# Patient Record
Sex: MALE | Race: WHITE | Employment: OTHER | URBAN - METROPOLITAN AREA
[De-identification: names, ages, dates, MRNs, and addresses within clinical notes are randomized per-mention and may not be internally consistent; named-entity substitution may affect disease eponyms.]

---

## 2018-08-15 ENCOUNTER — HOSPITAL ENCOUNTER (EMERGENCY)
Age: 53
Discharge: LEFT BEFORE DISCHARGE | End: 2018-08-15
Payer: COMMERCIAL

## 2018-08-15 VITALS
TEMPERATURE: 97.3 F | DIASTOLIC BLOOD PRESSURE: 83 MMHG | OXYGEN SATURATION: 97 % | WEIGHT: 195 LBS | RESPIRATION RATE: 18 BRPM | SYSTOLIC BLOOD PRESSURE: 170 MMHG | BODY MASS INDEX: 27.92 KG/M2 | HEIGHT: 70 IN | HEART RATE: 74 BPM

## 2018-08-15 PROCEDURE — 4500000002 HC ER NO CHARGE

## 2018-08-15 RX ORDER — IBUPROFEN 200 MG
200 TABLET ORAL EVERY 6 HOURS PRN
COMMUNITY

## 2018-08-15 ASSESSMENT — PAIN SCALES - GENERAL: PAINLEVEL_OUTOF10: 5

## 2018-08-16 NOTE — ED PROVIDER NOTES
8:30 PM attempted to interview patient in room patient was not in room. I believe the patient left without being seen just after RN triage.      Oliva Wilcox PA-C  08/15/18 9253

## 2018-10-24 ENCOUNTER — HOSPITAL ENCOUNTER (EMERGENCY)
Age: 53
Discharge: ANOTHER HOSPITAL | End: 2018-10-24
Attending: EMERGENCY MEDICINE
Payer: COMMERCIAL

## 2018-10-24 ENCOUNTER — APPOINTMENT (OUTPATIENT)
Dept: GENERAL RADIOLOGY | Age: 53
End: 2018-10-24
Payer: COMMERCIAL

## 2018-10-24 ENCOUNTER — APPOINTMENT (OUTPATIENT)
Dept: CT IMAGING | Age: 53
End: 2018-10-24
Payer: COMMERCIAL

## 2018-10-24 VITALS
OXYGEN SATURATION: 98 % | DIASTOLIC BLOOD PRESSURE: 83 MMHG | SYSTOLIC BLOOD PRESSURE: 138 MMHG | HEIGHT: 71 IN | HEART RATE: 86 BPM | WEIGHT: 200 LBS | TEMPERATURE: 97.4 F | BODY MASS INDEX: 28 KG/M2 | RESPIRATION RATE: 15 BRPM

## 2018-10-24 DIAGNOSIS — S32.009A CLOSED FRACTURE OF LUMBAR VERTEBRA, UNSPECIFIED FRACTURE MORPHOLOGY, UNSPECIFIED LUMBAR VERTEBRAL LEVEL, INITIAL ENCOUNTER (HCC): ICD-10-CM

## 2018-10-24 DIAGNOSIS — S92.012A CLOSED DISPLACED FRACTURE OF BODY OF LEFT CALCANEUS, INITIAL ENCOUNTER: Primary | ICD-10-CM

## 2018-10-24 LAB
A/G RATIO: 1.8 (ref 1.1–2.2)
ALBUMIN SERPL-MCNC: 4.9 G/DL (ref 3.4–5)
ALP BLD-CCNC: 73 U/L (ref 40–129)
ALT SERPL-CCNC: 29 U/L (ref 10–40)
ANION GAP SERPL CALCULATED.3IONS-SCNC: 14 MMOL/L (ref 3–16)
AST SERPL-CCNC: 34 U/L (ref 15–37)
BASOPHILS ABSOLUTE: 0.1 K/UL (ref 0–0.2)
BASOPHILS RELATIVE PERCENT: 0.7 %
BILIRUB SERPL-MCNC: 0.5 MG/DL (ref 0–1)
BUN BLDV-MCNC: 13 MG/DL (ref 7–20)
CALCIUM SERPL-MCNC: 9.8 MG/DL (ref 8.3–10.6)
CHLORIDE BLD-SCNC: 101 MMOL/L (ref 99–110)
CO2: 25 MMOL/L (ref 21–32)
CREAT SERPL-MCNC: 1 MG/DL (ref 0.9–1.3)
EOSINOPHILS ABSOLUTE: 0.1 K/UL (ref 0–0.6)
EOSINOPHILS RELATIVE PERCENT: 0.7 %
GFR AFRICAN AMERICAN: >60
GFR NON-AFRICAN AMERICAN: >60
GLOBULIN: 2.8 G/DL
GLUCOSE BLD-MCNC: 117 MG/DL (ref 70–99)
HCT VFR BLD CALC: 46.8 % (ref 40.5–52.5)
HEMOGLOBIN: 15.9 G/DL (ref 13.5–17.5)
LYMPHOCYTES ABSOLUTE: 4 K/UL (ref 1–5.1)
LYMPHOCYTES RELATIVE PERCENT: 32.7 %
MCH RBC QN AUTO: 29.5 PG (ref 26–34)
MCHC RBC AUTO-ENTMCNC: 34 G/DL (ref 31–36)
MCV RBC AUTO: 86.9 FL (ref 80–100)
MONOCYTES ABSOLUTE: 0.7 K/UL (ref 0–1.3)
MONOCYTES RELATIVE PERCENT: 6 %
NEUTROPHILS ABSOLUTE: 7.3 K/UL (ref 1.7–7.7)
NEUTROPHILS RELATIVE PERCENT: 59.9 %
PDW BLD-RTO: 13.1 % (ref 12.4–15.4)
PLATELET # BLD: 270 K/UL (ref 135–450)
PMV BLD AUTO: 7.6 FL (ref 5–10.5)
POTASSIUM SERPL-SCNC: 3.8 MMOL/L (ref 3.5–5.1)
RBC # BLD: 5.39 M/UL (ref 4.2–5.9)
SODIUM BLD-SCNC: 140 MMOL/L (ref 136–145)
TOTAL PROTEIN: 7.7 G/DL (ref 6.4–8.2)
WBC # BLD: 12.3 K/UL (ref 4–11)

## 2018-10-24 PROCEDURE — 93005 ELECTROCARDIOGRAM TRACING: CPT | Performed by: EMERGENCY MEDICINE

## 2018-10-24 PROCEDURE — 96376 TX/PRO/DX INJ SAME DRUG ADON: CPT

## 2018-10-24 PROCEDURE — 99285 EMERGENCY DEPT VISIT HI MDM: CPT

## 2018-10-24 PROCEDURE — 72131 CT LUMBAR SPINE W/O DYE: CPT

## 2018-10-24 PROCEDURE — 70450 CT HEAD/BRAIN W/O DYE: CPT

## 2018-10-24 PROCEDURE — 96374 THER/PROPH/DIAG INJ IV PUSH: CPT

## 2018-10-24 PROCEDURE — 80053 COMPREHEN METABOLIC PANEL: CPT

## 2018-10-24 PROCEDURE — 4500000025 HC ED LEVEL 5 PROCEDURE

## 2018-10-24 PROCEDURE — 6360000002 HC RX W HCPCS: Performed by: EMERGENCY MEDICINE

## 2018-10-24 PROCEDURE — 72125 CT NECK SPINE W/O DYE: CPT

## 2018-10-24 PROCEDURE — 73610 X-RAY EXAM OF ANKLE: CPT

## 2018-10-24 PROCEDURE — 85025 COMPLETE CBC W/AUTO DIFF WBC: CPT

## 2018-10-24 RX ORDER — HYDROMORPHONE HCL 110MG/55ML
1 PATIENT CONTROLLED ANALGESIA SYRINGE INTRAVENOUS ONCE
Status: COMPLETED | OUTPATIENT
Start: 2018-10-24 | End: 2018-10-24

## 2018-10-24 RX ADMIN — HYDROMORPHONE HYDROCHLORIDE 1 MG: 2 INJECTION INTRAMUSCULAR; INTRAVENOUS; SUBCUTANEOUS at 18:39

## 2018-10-24 RX ADMIN — HYDROMORPHONE HYDROCHLORIDE 1 MG: 2 INJECTION INTRAMUSCULAR; INTRAVENOUS; SUBCUTANEOUS at 17:25

## 2018-10-24 RX ADMIN — HYDROMORPHONE HYDROCHLORIDE 1 MG: 2 INJECTION INTRAMUSCULAR; INTRAVENOUS; SUBCUTANEOUS at 16:58

## 2018-10-24 ASSESSMENT — PAIN SCALES - GENERAL
PAINLEVEL_OUTOF10: 10

## 2018-10-24 ASSESSMENT — PAIN DESCRIPTION - LOCATION: LOCATION: ANKLE;BACK

## 2018-10-24 ASSESSMENT — PAIN DESCRIPTION - ORIENTATION: ORIENTATION: LEFT;LOWER

## 2018-10-24 NOTE — ED PROVIDER NOTES
No fracture or malalignment of the cervical spine. CT Head WO Contrast   Final Result   No acute intracranial abnormality. No fracture or malalignment of the cervical spine. XR ANKLE LEFT (MIN 3 VIEWS)   Final Result   Comminuted fracture left calcaneus with flattening and disruption of the   subtalar joint with multiple fracture fragments. Probable midfoot instability with widening of the tarsal navicular-cuneiform   articulation. RECOMMENDATION:   Recommend orthopedic consultation. CT scan of the foot might be considered   for further evaluation of complex fracture-instability noted above. Procedures    MDM:  After my initial evaluation, I do feel these require a CT scan of the lumbar spine, cervical spine, and head. The patient was in need to have x-rays of his left ankle. Patient's x-rays demonstrate what appeared to calcaneus fracture with what appears midfoot injury as well. The patient has no abdominal pain, or chest pain/difficulty breathing. The patient had an obvious calcaneal fracture on x-ray, and the CT of the lumbar spine demonstrates fractures of L1, and the spinous processes of L2 and L3. The patient was kept in the cervical collar after his CT of the cervical spine and head were normal.  I did contact Dr. Keyanna Jamison from  trauma, and they are willing to accept the patient for transfer. They would like us to hold off on doing the CTA of the abdomen and pelvis secondary to fact that they would prefer to do it at their facility. I do feel that he should be transported by helicopter secondary to fact that he has a possible arterial injury. The patient was splinted here in the emergency department, and was transported stable via helicopter. Critical care time provided of 33 minutes, excluding any previous dictated procedures.   This time is being requested for physical examination, laboratory interpretation, medical intervention, splinting, frequent reassessments, physician consultation, and charting    Clinical Impression:  1. Closed displaced fracture of body of left calcaneus, initial encounter    2.  Closed fracture of lumbar vertebra, unspecified fracture morphology, unspecified lumbar vertebral level, initial encounter ([facility] [address]      (Please note that portions of this note mayhave been completed with a voice recognition program. Efforts were made to edit the dictations but occasionally words are mis-transcribed.)    MD Kimmy George MD  10/24/18 0872

## 2018-10-24 NOTE — ED NOTES
Patient screaming that his right calf is cramping. MD at bedside. Patient alert and oriented; remains on continuous cardiac, SpO2, BP, and RR monitoring. Additional pain medication administered. Patient remains supine and in c-spine precautions. Patient's SpO2 reading 91% after pain medication administration, placed on 2L NC. Patient and wife updated on plan of care and are agreeable. Will continue to monitor.       Svetlana Sanchez RN  10/24/18 3702

## 2018-10-24 NOTE — ED NOTES
1840 - X-ray pushing to   1846 - called  transfer center [facility])   Dx: > 2 ft fall with lumbar fx  1850 - Dr. Eliseo Bazzi is going to be talking to Dr. Juanita Vidal (ED to ED) Report # Malathi Danielito  10/24/18 1851    Dr. Bill Pop called back via [facility]. Dr. Eliseo Bazzi asked for [facility] to fly to [address] [place] [address]  10/24/18 1900    [address] landed outside.       Leanne Manning  10/24/18 1907

## 2018-10-24 NOTE — ED NOTES
26- Dr. Anika Brewster returned page and spoke to Dr. Charu Jones. 1900- Per Dr. Charu Jones, patient will be flying to [address] called and informed that they have a crew available to fly patient. 1904- [facility] 1905-Informed that [facility] is in route and will be landing in minutes.    56- Security informed of [facility] landing, and lights turned on.   1908- [facility] Hermelindos landed    Evaline Lopez leaving [facility] transporting patient to [address]  10/24/18 [facility]  10/24/18 1919

## 2018-10-25 PROCEDURE — 93010 ELECTROCARDIOGRAM REPORT: CPT | Performed by: INTERNAL MEDICINE

## 2018-10-31 LAB
EKG ATRIAL RATE: 81 BPM
EKG DIAGNOSIS: NORMAL
EKG P AXIS: 70 DEGREES
EKG P-R INTERVAL: 152 MS
EKG Q-T INTERVAL: 370 MS
EKG QRS DURATION: 82 MS
EKG QTC CALCULATION (BAZETT): 429 MS
EKG R AXIS: 76 DEGREES
EKG T AXIS: 69 DEGREES
EKG VENTRICULAR RATE: 81 BPM

## 2020-03-18 NOTE — ED NOTES
Bed: 17  Expected date:   Expected time:   Means of arrival:   Comments:  [id number], RN  10/24/18 [facility] [facility] Emergency Department  [address] [place] [address]  Phone:  [phone]  Fax:  [phone]                                    Gautam Kelly   MRN: [id number]    Department:  [facility] Emergency Department   Date of Visit:  3/18/2020           After Visit Summary Signature Page    I have received my discharge instructions, and my questions have been answered. I have discussed any challenges I see with this plan with the nurse or doctor.    ..........................................................................................................................................  Patient/Patient Representative Signature      ..........................................................................................................................................  Patient Representative Print Name and Relationship to Patient    ..................................................               ................................................  Date                                   Time    ..........................................................................................................................................  Reviewed by Signature/Title    ...................................................              ..............................................  Date                                               Time          22EPIC Rev 08/18

## 2020-04-06 ENCOUNTER — HOSPITAL ENCOUNTER (EMERGENCY)
Age: 55
Discharge: LEFT BEFORE DISCHARGE | End: 2020-04-06
Payer: COMMERCIAL

## 2020-04-06 ENCOUNTER — APPOINTMENT (OUTPATIENT)
Dept: CT IMAGING | Age: 55
End: 2020-04-06
Payer: COMMERCIAL

## 2020-04-06 ENCOUNTER — APPOINTMENT (OUTPATIENT)
Dept: GENERAL RADIOLOGY | Age: 55
End: 2020-04-06
Payer: COMMERCIAL

## 2020-04-06 VITALS
HEIGHT: 70 IN | TEMPERATURE: 98.6 F | SYSTOLIC BLOOD PRESSURE: 184 MMHG | HEART RATE: 91 BPM | DIASTOLIC BLOOD PRESSURE: 94 MMHG | BODY MASS INDEX: 33.5 KG/M2 | RESPIRATION RATE: 18 BRPM | OXYGEN SATURATION: 96 % | WEIGHT: 234 LBS

## 2020-04-06 PROCEDURE — 99283 EMERGENCY DEPT VISIT LOW MDM: CPT

## 2020-04-06 PROCEDURE — 73030 X-RAY EXAM OF SHOULDER: CPT

## 2020-04-06 PROCEDURE — 73200 CT UPPER EXTREMITY W/O DYE: CPT

## 2020-04-06 ASSESSMENT — ENCOUNTER SYMPTOMS
RHINORRHEA: 0
SORE THROAT: 0
SHORTNESS OF BREATH: 0
COLOR CHANGE: 0
ABDOMINAL PAIN: 0

## 2020-04-06 ASSESSMENT — PAIN SCALES - GENERAL: PAINLEVEL_OUTOF10: 0

## 2020-04-07 NOTE — ED PROVIDER NOTES
Evaluated by [id number] Provider          [facility] ED  EMERGENCY DEPARTMENT ENCOUNTER        Pt Name: Brittany Velasquez  MRN: [id number]  [place] 1965  Dateof evaluation: 4/6/2020  Provider: NATIVIDAD Rinaldi - CNP  PCP: No primary care provider on file. ED Attending: No att. providers found    [facility]       Chief Complaint   Patient presents with    Shoulder Injury     pt. states a tree limb fell on his R shoulder today. no obvious deformity, pt. denies head injury, pt. is able to demonstrate full rom       HISTORY OF PRESENTILLNESS   (Location/Symptom, Timing/Onset, Context/Setting, Quality, Duration, Modifying Factors, Severity)  Note limiting factors. Brittany Velasquez is a 53 y.o. male for right shoulder pain. Onset was today. Duration has been since the onset. Context includes pt states a tree branch fell onto his right shoulder today and he has had pain since. Alleviating factors include nothing. Aggravating factors include nothing. Pain is 0/10. nothing has been used for pain today. Nursing Notes were all reviewed and agreed with or any disagreements were addressed  in the HPI. REVIEW OF SYSTEMS    (2-9 systems for level 4, 10 or more for level 5)     Review of Systems   Constitutional: Negative for fever. HENT: Negative for congestion, rhinorrhea and sore throat. Respiratory: Negative for shortness of breath. Cardiovascular: Negative for chest pain. Gastrointestinal: Negative for abdominal pain. Genitourinary: Negative for decreased urine volume and difficulty urinating. Musculoskeletal: Negative for arthralgias and myalgias. Right shoulder swelling and pain   Skin: Negative for color change and rash. Neurological: Negative for dizziness and light-headedness. Psychiatric/Behavioral: Negative for agitation. All other systems reviewed and are negative. Positives and Pertinent negatives as per HPI.   Except as noted above

## 2020-08-17 ENCOUNTER — OFFICE VISIT (OUTPATIENT)
Dept: ORTHOPEDIC SURGERY | Age: 55
End: 2020-08-17
Payer: COMMERCIAL

## 2020-08-17 VITALS — WEIGHT: 234 LBS | BODY MASS INDEX: 33.5 KG/M2 | HEIGHT: 70 IN

## 2020-08-17 PROCEDURE — 3017F COLORECTAL CA SCREEN DOC REV: CPT | Performed by: ORTHOPAEDIC SURGERY

## 2020-08-17 PROCEDURE — G8427 DOCREV CUR MEDS BY ELIG CLIN: HCPCS | Performed by: ORTHOPAEDIC SURGERY

## 2020-08-17 PROCEDURE — 4004F PT TOBACCO SCREEN RCVD TLK: CPT | Performed by: ORTHOPAEDIC SURGERY

## 2020-08-17 PROCEDURE — G8417 CALC BMI ABV UP PARAM F/U: HCPCS | Performed by: ORTHOPAEDIC SURGERY

## 2020-08-17 PROCEDURE — 99204 OFFICE O/P NEW MOD 45 MIN: CPT | Performed by: ORTHOPAEDIC SURGERY

## 2020-08-17 NOTE — PROGRESS NOTES
Chief Complaint    New Patient (2nd opinon Left Foot Pain)      History of Present Illness:  Naya Diaz is a 53 y.o. male for evaluation of chief complaint left foot and ankle pain. This started approximately 2 years ago when he had a fall from significant height fixing a [employer] roof. This is treated by an outside physician at [address] [place] [address] Dr. Mauro Edwards. He sustained a severe calcaneus fracture with a naviculocuneiform dislocation. He likely got a foot compartment syndrome as well at that time. .  Symptoms are worse with any sort of activity and at night and better with boot immobilization which she has been in for pretty much the last 2 years. Patient endorses significant foot pain throughout the day and at night as well as a pins-and-needles sensation is forefoot but denies fevers or chills. Treatment to date includes: Multiple surgeries including subtalar bone block arthrodesis most recently as well as navicular cuneiform fusion. Therapy, Neurontin, boot immobilization have all been tried. When he wears the cam boot he is able to get through the workday but otherwise feels like he is in significant pain and walking on the lateral side of his foot. Medical History:  Patient's medications, allergies, past medical, surgical, social and family histories were reviewed and updated as appropriate. Review of Systems:  Relevant review of systems reviewed and available in the patient's chart. They are negative or noncontributory except as per HPI. Vital Signs: There were no vitals filed for this visit. 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 I would like to obtain CT scans to make sure that everything is healed up and that that is not the cause of his pain prior to considering that option. Unless the patient does say that this is been brought up to him before and in fact his injury was so severe the initial treating surgeon offered him amputation acutely. The patient elected to proceed with option to we will try to find a way to get a bracing solution to his problem. I will see the patient back after he has obtained his brace or if he is unable to to see how he was doing functionally. Greater than 45 minutes was spent face-to-face in addition to coordination of care with this patient including very high complexity decision making in this patient.

## 2024-01-03 ENCOUNTER — HOSPITAL ENCOUNTER (EMERGENCY)
Age: 59
Discharge: HOME | End: 2024-01-03
Attending: INTERNAL MEDICINE
Payer: COMMERCIAL

## 2024-01-03 ENCOUNTER — APPOINTMENT (OUTPATIENT)
Dept: CT IMAGING | Age: 59
End: 2024-01-03
Payer: COMMERCIAL

## 2024-01-03 ENCOUNTER — HOSPITAL ENCOUNTER (EMERGENCY)
Age: 59
Discharge: HOME | End: 2024-01-03
Attending: EMERGENCY MEDICINE
Payer: COMMERCIAL

## 2024-01-03 VITALS
DIASTOLIC BLOOD PRESSURE: 73 MMHG | BODY MASS INDEX: 30.35 KG/M2 | HEART RATE: 54 BPM | HEIGHT: 70 IN | RESPIRATION RATE: 16 BRPM | OXYGEN SATURATION: 96 % | WEIGHT: 212 LBS | SYSTOLIC BLOOD PRESSURE: 141 MMHG | TEMPERATURE: 98.5 F

## 2024-01-03 VITALS
OXYGEN SATURATION: 98 % | RESPIRATION RATE: 18 BRPM | DIASTOLIC BLOOD PRESSURE: 96 MMHG | TEMPERATURE: 98.2 F | HEART RATE: 60 BPM | HEIGHT: 70 IN | WEIGHT: 213.2 LBS | SYSTOLIC BLOOD PRESSURE: 179 MMHG | BODY MASS INDEX: 30.52 KG/M2

## 2024-01-03 DIAGNOSIS — R03.0 ELEVATED BLOOD PRESSURE READING: ICD-10-CM

## 2024-01-03 DIAGNOSIS — K04.7 DENTAL INFECTION: Primary | ICD-10-CM

## 2024-01-03 DIAGNOSIS — K08.89 PAIN, DENTAL: Primary | ICD-10-CM

## 2024-01-03 DIAGNOSIS — D72.829 LEUKOCYTOSIS, UNSPECIFIED TYPE: ICD-10-CM

## 2024-01-03 DIAGNOSIS — R22.0 RIGHT FACIAL SWELLING: ICD-10-CM

## 2024-01-03 LAB
ALBUMIN SERPL-MCNC: 4.5 G/DL (ref 3.4–5)
ALBUMIN/GLOB SERPL: 1.5 {RATIO} (ref 1.1–2.2)
ALP SERPL-CCNC: 71 U/L (ref 40–129)
ALT SERPL-CCNC: 15 U/L (ref 10–40)
ANION GAP SERPL CALCULATED.3IONS-SCNC: 9 MMOL/L (ref 3–16)
AST SERPL-CCNC: 19 U/L (ref 15–37)
BASOPHILS # BLD: 0 K/UL (ref 0–0.2)
BASOPHILS NFR BLD: 0.3 %
BILIRUB SERPL-MCNC: 0.3 MG/DL (ref 0–1)
BUN SERPL-MCNC: 17 MG/DL (ref 7–20)
CALCIUM SERPL-MCNC: 9.6 MG/DL (ref 8.3–10.6)
CHLORIDE SERPL-SCNC: 106 MMOL/L (ref 99–110)
CO2 SERPL-SCNC: 27 MMOL/L (ref 21–32)
CREAT SERPL-MCNC: 0.8 MG/DL (ref 0.9–1.3)
DEPRECATED RDW RBC AUTO: 13.7 % (ref 12.4–15.4)
EOSINOPHIL # BLD: 0.3 K/UL (ref 0–0.6)
EOSINOPHIL NFR BLD: 2.8 %
GFR SERPLBLD CREATININE-BSD FMLA CKD-EPI: >60 ML/MIN/{1.73_M2}
GLUCOSE SERPL-MCNC: 114 MG/DL (ref 70–99)
HCT VFR BLD AUTO: 46.4 % (ref 40.5–52.5)
HGB BLD-MCNC: 15.7 G/DL (ref 13.5–17.5)
LYMPHOCYTES # BLD: 2.2 K/UL (ref 1–5.1)
LYMPHOCYTES NFR BLD: 17.9 %
MCH RBC QN AUTO: 28.8 PG (ref 26–34)
MCHC RBC AUTO-ENTMCNC: 33.8 G/DL (ref 31–36)
MCV RBC AUTO: 85.3 FL (ref 80–100)
MONOCYTES # BLD: 0.9 K/UL (ref 0–1.3)
MONOCYTES NFR BLD: 7.4 %
NEUTROPHILS # BLD: 8.8 K/UL (ref 1.7–7.7)
NEUTROPHILS NFR BLD: 71.6 %
PLATELET # BLD AUTO: 238 K/UL (ref 135–450)
PMV BLD AUTO: 7.3 FL (ref 5–10.5)
POTASSIUM SERPL-SCNC: 4.6 MMOL/L (ref 3.5–5.1)
PROT SERPL-MCNC: 7.6 G/DL (ref 6.4–8.2)
RBC # BLD AUTO: 5.45 M/UL (ref 4.2–5.9)
SODIUM SERPL-SCNC: 142 MMOL/L (ref 136–145)
WBC # BLD AUTO: 12.2 K/UL (ref 4–11)

## 2024-01-03 PROCEDURE — 36415 COLL VENOUS BLD VENIPUNCTURE: CPT

## 2024-01-03 PROCEDURE — 96375 TX/PRO/DX INJ NEW DRUG ADDON: CPT

## 2024-01-03 PROCEDURE — 2580000003 HC RX 258: Performed by: EMERGENCY MEDICINE

## 2024-01-03 PROCEDURE — 6370000000 HC RX 637 (ALT 250 FOR IP): Performed by: EMERGENCY MEDICINE

## 2024-01-03 PROCEDURE — 85025 COMPLETE CBC W/AUTO DIFF WBC: CPT

## 2024-01-03 PROCEDURE — 6360000004 HC RX CONTRAST MEDICATION: Performed by: EMERGENCY MEDICINE

## 2024-01-03 PROCEDURE — 80053 COMPREHEN METABOLIC PANEL: CPT

## 2024-01-03 PROCEDURE — 70487 CT MAXILLOFACIAL W/DYE: CPT

## 2024-01-03 PROCEDURE — 6360000002 HC RX W HCPCS: Performed by: EMERGENCY MEDICINE

## 2024-01-03 PROCEDURE — 96365 THER/PROPH/DIAG IV INF INIT: CPT

## 2024-01-03 PROCEDURE — 99285 EMERGENCY DEPT VISIT HI MDM: CPT

## 2024-01-03 PROCEDURE — 99282 EMERGENCY DEPT VISIT SF MDM: CPT

## 2024-01-03 RX ORDER — AMOXICILLIN AND CLAVULANATE POTASSIUM 875; 125 MG/1; MG/1
1 TABLET, FILM COATED ORAL 2 TIMES DAILY
Qty: 20 TABLET | Refills: 0 | Status: SHIPPED | OUTPATIENT
Start: 2024-01-03 | End: 2024-01-03

## 2024-01-03 RX ORDER — KETOROLAC TROMETHAMINE 15 MG/ML
15 INJECTION, SOLUTION INTRAMUSCULAR; INTRAVENOUS ONCE
Status: COMPLETED | OUTPATIENT
Start: 2024-01-03 | End: 2024-01-03

## 2024-01-03 RX ORDER — ACETAMINOPHEN 325 MG/1
650 TABLET ORAL ONCE
Status: COMPLETED | OUTPATIENT
Start: 2024-01-03 | End: 2024-01-03

## 2024-01-03 RX ORDER — CLINDAMYCIN HYDROCHLORIDE 300 MG/1
300 CAPSULE ORAL 3 TIMES DAILY
Qty: 21 CAPSULE | Refills: 0 | Status: SHIPPED | OUTPATIENT
Start: 2024-01-03 | End: 2024-01-03

## 2024-01-03 RX ORDER — DEXAMETHASONE 0.5 MG/1
0.5 TABLET ORAL 2 TIMES DAILY
Qty: 6 TABLET | Refills: 0 | Status: SHIPPED | OUTPATIENT
Start: 2024-01-03 | End: 2024-01-06

## 2024-01-03 RX ORDER — CLINDAMYCIN HYDROCHLORIDE 300 MG/1
300 CAPSULE ORAL 3 TIMES DAILY
Qty: 21 CAPSULE | Refills: 0 | Status: SHIPPED | OUTPATIENT
Start: 2024-01-03 | End: 2024-01-10

## 2024-01-03 RX ORDER — BUPRENORPHINE AND NALOXONE 8; 2 MG/1; MG/1
1 FILM, SOLUBLE BUCCAL; SUBLINGUAL DAILY
COMMUNITY

## 2024-01-03 RX ORDER — PENICILLIN V POTASSIUM 500 MG/1
TABLET ORAL
COMMUNITY
Start: 2023-12-22 | End: 2024-01-03

## 2024-01-03 RX ADMIN — IOMEPROL INJECTION 75 ML: 714 INJECTION, SOLUTION INTRAVASCULAR at 05:43

## 2024-01-03 RX ADMIN — ACETAMINOPHEN 650 MG: 325 TABLET ORAL at 05:06

## 2024-01-03 RX ADMIN — AMPICILLIN AND SULBACTAM 3000 MG: 2; 1 INJECTION, POWDER, FOR SOLUTION INTRAVENOUS at 05:08

## 2024-01-03 RX ADMIN — KETOROLAC TROMETHAMINE 15 MG: 15 INJECTION, SOLUTION INTRAMUSCULAR; INTRAVENOUS at 05:06

## 2024-01-03 ASSESSMENT — PAIN SCALES - GENERAL
PAINLEVEL_OUTOF10: 10
PAINLEVEL_OUTOF10: 10
PAINLEVEL_OUTOF10: 4

## 2024-01-03 ASSESSMENT — PAIN DESCRIPTION - PAIN TYPE
TYPE: ACUTE PAIN
TYPE: ACUTE PAIN

## 2024-01-03 ASSESSMENT — ENCOUNTER SYMPTOMS
VOMITING: 0
EYE PAIN: 0
SHORTNESS OF BREATH: 0
ABDOMINAL PAIN: 0
FACIAL SWELLING: 1
COLOR CHANGE: 1
SINUS PAIN: 1
PHOTOPHOBIA: 0

## 2024-01-03 ASSESSMENT — PAIN DESCRIPTION - LOCATION
LOCATION: MOUTH
LOCATION: MOUTH

## 2024-01-03 ASSESSMENT — PAIN DESCRIPTION - ORIENTATION
ORIENTATION: RIGHT
ORIENTATION: UPPER

## 2024-01-03 ASSESSMENT — PAIN - FUNCTIONAL ASSESSMENT
PAIN_FUNCTIONAL_ASSESSMENT: 0-10
PAIN_FUNCTIONAL_ASSESSMENT: 0-10

## 2024-01-03 ASSESSMENT — PAIN DESCRIPTION - DESCRIPTORS
DESCRIPTORS: THROBBING
DESCRIPTORS: STABBING

## 2024-01-03 ASSESSMENT — PAIN DESCRIPTION - ONSET: ONSET: AWAKENED FROM SLEEP

## 2024-01-03 ASSESSMENT — LIFESTYLE VARIABLES: HOW OFTEN DO YOU HAVE A DRINK CONTAINING ALCOHOL: NEVER

## 2024-01-03 ASSESSMENT — PAIN DESCRIPTION - FREQUENCY: FREQUENCY: CONTINUOUS

## 2024-01-03 NOTE — ED PROVIDER NOTES
Emergency Department Encounter  Location: [facility] EMERGENCY DEPARTMENT    Patient: Satya Menon  MRN: [id number]  : 1965  Date of evaluation: 1/3/2024  ED Provider: JOSÉ BARTON DO    7:00a.m.  Satya Menon was checked out to me by Dr. Alonzo. Please see his/her initial documentation for details of the patient's initial ED presentation, physical exam and completed studies.    In brief, Satya Menon is a 58 y.o. male that presented to the emergency department for right upper dental pain that started on the  of last month.  Patient was started on penicillin and the pain initially improved but then the pain started to worsen.  Patient is still able to eat and drink without difficulty.  Pain is moderate to severe.    I have reviewed and interpreted all of the currently available lab results and diagnostics from this visit:    Labs  Results for orders placed or performed during the hospital encounter of 24   CBC with Auto Differential   Result Value Ref Range    WBC 12.2 (H) 4.0 - 11.0 K/uL    RBC 5.45 4.20 - 5.90 M/uL    Hemoglobin 15.7 13.5 - 17.5 g/dL    Hematocrit 46.4 40.5 - 52.5 %    MCV 85.3 80.0 - 100.0 fL    MCH 28.8 26.0 - 34.0 pg    MCHC 33.8 31.0 - 36.0 g/dL    RDW 13.7 12.4 - 15.4 %    Platelets 238 135 - 450 K/uL    MPV 7.3 5.0 - 10.5 fL    Neutrophils % 71.6 %    Lymphocytes % 17.9 %    Monocytes % 7.4 %    Eosinophils % 2.8 %    Basophils % 0.3 %    Neutrophils Absolute 8.8 (H) 1.7 - 7.7 K/uL    Lymphocytes Absolute 2.2 1.0 - 5.1 K/uL    Monocytes Absolute 0.9 0.0 - 1.3 K/uL    Eosinophils Absolute 0.3 0.0 - 0.6 K/uL    Basophils Absolute 0.0 0.0 - 0.2 K/uL   Comprehensive Metabolic Panel   Result Value Ref Range    Sodium 142 136 - 145 mmol/L    Potassium 4.6 3.5 - 5.1 mmol/L    Chloride 106 99 - 110 mmol/L    CO2 27 21 - 32 mmol/L    Anion Gap 9 3 - 16    Glucose 114 (H) 70 - 99 mg/dL    BUN 17 7 - 20 mg/dL    Creatinine 0.8 (L) 0.9 - 1.3 mg/dL    Est, Glom Filt Rate >60 >60 
for any deeper abscess.  He denied any chest pain or shortness of breath and story not concerning for acute coronary syndrome or pulmonary embolism.  He denies any lower dental concerns and no concern for Tanner's angina at this time.      I did reevaluate the patient at 0625 and at that point he stated he felt like the swelling was starting to go down and his pain had improved to a 5 out of 10 and was much more tolerable.  He is aware that I am still waiting on the CT result but otherwise his initial labs were reassuring.  He was hoping to make it home within the next hour to help his kids get ready for school.        Patient was informed of his elevated blood pressure and to have this rechecked when he is not in pain and if still elevated, he may need medication for this.  He also was given a referral for primary doctor.    Patient is aware to return to the ED over the next 6 to 24 hours for any worsening facial swelling or pain with visual changes, severe headache, vomiting, new onset chest pain, or any other concerns, but otherwise call his dentist today to see about more urgent evaluation or potentially oral surgery if any teeth extractions are needed.  He was told to follow-up with primary doctor in the next 1 to 2 weeks for any other concerns.     CT was still not back at 0700 and therefore I did ultimately sign the patient out to Dr. Dia for repeat evaluation once the CT was back in to go over the results with the patient.  I do expect the patient to be discharged unless there is anything significantly concerning on CT I am not expecting based on my brief viewing of the scan prior to leaving at the end of my shift.  Please see Dr. Dia's note for further care of the patient while in the emergency department and final disposition.    I was the primary provider for the patient.      The patient tolerated their visit well.   The patient and / or the family were informed of the results of any tests, a time

## 2024-01-03 NOTE — DISCHARGE INSTRUCTIONS
Take Tylenol or ibuprofen as needed for pain.  Take Augmentin due to concern for infection.  Do not take more than 3 g of Tylenol per day.    Call your dentist today to see about more urgent evaluation to ensure it is improving.  You can also try calling [facility] oral surgery to see if they have any availability.    Have your blood pressure rechecked when your pain has improved and if still elevated, you may need medication for this.    Return to the emergency department over the next 6 to 24 hours for any worsening facial pain or swelling with visual changes, severe headache with vomiting, new onset chest pain with shortness of breath, high fever, or any other concerns.

## 2024-01-03 NOTE — ED TRIAGE NOTES
Pt has bad tooth to right upper in front. Recently seen and tx for the abscess, has follow up scheduled for the 12th. Swelling has gone up towards right eye and pain has worsened.

## 2024-01-03 NOTE — ED PROVIDER NOTES
EMERGENCY MEDICINE PROVIDER NOTE    Patient Identification  Pt Name: Satya Menon  MRN: [id number]  Birthdate 1965  Date of evaluation: 1/3/2024  Provider: JOSÉ BARTON DO  PCP: No primary care provider on file.    Chief Complaint  Dental Pain (Dental pain to right side of mouth.  Went to Urgent Care for antibiotics 1.5 weeks.  Came here earlier and seen this morning.  Back for pain management. )      HPI  (History provided by patient)  This is a 58 y.o. male who was brought in by self for right upper dental pain.  Patient was seen by the overnight physician and signed out to me in the early morning.  I then discharge the patient after reviewing the CAT scan.  Patient came back to the pain I did notice that the patient is on Suboxone.  He denies any fever or chills.  Patient denies nausea and vomiting.    I have reviewed the following nursing documentation:  Allergies: Patient has no known allergies.    Past medical history:   Past Medical History:   Diagnosis Date    Amputated finger     Back pain      Past surgical history:   Past Surgical History:   Procedure Laterality Date    HAND SURGERY         Home medications:   Previous Medications    BUPRENORPHINE-NALOXONE (SUBOXONE) 8-2 MG FILM SL FILM    Place 1 Film under the tongue daily. Max Daily Amount: 1 Film    CLINDAMYCIN (CLEOCIN) 300 MG CAPSULE    Take 1 capsule by mouth 3 times daily for 7 days    DEXAMETHASONE (DECADRON) 0.5 MG TABLET    Take 1 tablet by mouth in the morning and at bedtime for 3 days    IBUPROFEN (ADVIL;MOTRIN) 200 MG TABLET    Take 200 mg by mouth every 6 hours as needed for Pain       Social history:  reports that he has been smoking cigarettes. He has never used smokeless tobacco. He reports that he does not currently use drugs after having used the following drugs: Cocaine. He reports that he does not drink alcohol.    Family history:  No family history on file.    Exam  ED Triage Vitals [01/03/24 1048]   BP Temp Temp Source